# Patient Record
Sex: FEMALE | Race: WHITE | NOT HISPANIC OR LATINO | URBAN - METROPOLITAN AREA
[De-identification: names, ages, dates, MRNs, and addresses within clinical notes are randomized per-mention and may not be internally consistent; named-entity substitution may affect disease eponyms.]

---

## 2020-02-18 ENCOUNTER — EMERGENCY (EMERGENCY)
Facility: HOSPITAL | Age: 45
LOS: 1 days | Discharge: HOME | End: 2020-02-18
Attending: EMERGENCY MEDICINE | Admitting: EMERGENCY MEDICINE
Payer: COMMERCIAL

## 2020-02-18 VITALS
HEART RATE: 82 BPM | OXYGEN SATURATION: 98 % | WEIGHT: 220.02 LBS | DIASTOLIC BLOOD PRESSURE: 83 MMHG | SYSTOLIC BLOOD PRESSURE: 118 MMHG | RESPIRATION RATE: 15 BRPM | TEMPERATURE: 97 F | HEIGHT: 67 IN

## 2020-02-18 VITALS
HEART RATE: 75 BPM | RESPIRATION RATE: 17 BRPM | DIASTOLIC BLOOD PRESSURE: 74 MMHG | TEMPERATURE: 98 F | OXYGEN SATURATION: 98 % | SYSTOLIC BLOOD PRESSURE: 115 MMHG

## 2020-02-18 DIAGNOSIS — Z90.49 ACQUIRED ABSENCE OF OTHER SPECIFIED PARTS OF DIGESTIVE TRACT: Chronic | ICD-10-CM

## 2020-02-18 LAB
ALBUMIN SERPL ELPH-MCNC: 3.3 G/DL — SIGNIFICANT CHANGE UP (ref 3.3–5)
ALP SERPL-CCNC: 92 U/L — SIGNIFICANT CHANGE UP (ref 40–120)
ALT FLD-CCNC: 24 U/L — SIGNIFICANT CHANGE UP (ref 12–78)
ANION GAP SERPL CALC-SCNC: 8 MMOL/L — SIGNIFICANT CHANGE UP (ref 5–17)
APTT BLD: 25.3 SEC — LOW (ref 28.5–37)
AST SERPL-CCNC: 23 U/L — SIGNIFICANT CHANGE UP (ref 15–37)
BASOPHILS # BLD AUTO: 0.04 K/UL — SIGNIFICANT CHANGE UP (ref 0–0.2)
BASOPHILS NFR BLD AUTO: 0.6 % — SIGNIFICANT CHANGE UP (ref 0–2)
BILIRUB SERPL-MCNC: 0.4 MG/DL — SIGNIFICANT CHANGE UP (ref 0.2–1.2)
BUN SERPL-MCNC: 13 MG/DL — SIGNIFICANT CHANGE UP (ref 7–23)
CALCIUM SERPL-MCNC: 9.3 MG/DL — SIGNIFICANT CHANGE UP (ref 8.5–10.1)
CHLORIDE SERPL-SCNC: 107 MMOL/L — SIGNIFICANT CHANGE UP (ref 96–108)
CK MB BLD-MCNC: <2 % — SIGNIFICANT CHANGE UP (ref 0–3.5)
CK MB CFR SERPL CALC: <1 NG/ML — SIGNIFICANT CHANGE UP (ref 0–3.6)
CK SERPL-CCNC: 50 U/L — SIGNIFICANT CHANGE UP (ref 26–192)
CO2 SERPL-SCNC: 25 MMOL/L — SIGNIFICANT CHANGE UP (ref 22–31)
CREAT SERPL-MCNC: 0.67 MG/DL — SIGNIFICANT CHANGE UP (ref 0.5–1.3)
EOSINOPHIL # BLD AUTO: 0.08 K/UL — SIGNIFICANT CHANGE UP (ref 0–0.5)
EOSINOPHIL NFR BLD AUTO: 1.1 % — SIGNIFICANT CHANGE UP (ref 0–6)
GLUCOSE SERPL-MCNC: 98 MG/DL — SIGNIFICANT CHANGE UP (ref 70–99)
HCT VFR BLD CALC: 35.1 % — SIGNIFICANT CHANGE UP (ref 34.5–45)
HGB BLD-MCNC: 11.3 G/DL — LOW (ref 11.5–15.5)
IMM GRANULOCYTES NFR BLD AUTO: 0.4 % — SIGNIFICANT CHANGE UP (ref 0–1.5)
INR BLD: 1.02 RATIO — SIGNIFICANT CHANGE UP (ref 0.88–1.16)
LYMPHOCYTES # BLD AUTO: 1.95 K/UL — SIGNIFICANT CHANGE UP (ref 1–3.3)
LYMPHOCYTES # BLD AUTO: 28 % — SIGNIFICANT CHANGE UP (ref 13–44)
MCHC RBC-ENTMCNC: 26.4 PG — LOW (ref 27–34)
MCHC RBC-ENTMCNC: 32.2 GM/DL — SIGNIFICANT CHANGE UP (ref 32–36)
MCV RBC AUTO: 82 FL — SIGNIFICANT CHANGE UP (ref 80–100)
MONOCYTES # BLD AUTO: 0.47 K/UL — SIGNIFICANT CHANGE UP (ref 0–0.9)
MONOCYTES NFR BLD AUTO: 6.7 % — SIGNIFICANT CHANGE UP (ref 2–14)
NEUTROPHILS # BLD AUTO: 4.4 K/UL — SIGNIFICANT CHANGE UP (ref 1.8–7.4)
NEUTROPHILS NFR BLD AUTO: 63.2 % — SIGNIFICANT CHANGE UP (ref 43–77)
NRBC # BLD: 0 /100 WBCS — SIGNIFICANT CHANGE UP (ref 0–0)
PLATELET # BLD AUTO: 104 K/UL — LOW (ref 150–400)
POTASSIUM SERPL-MCNC: 3.8 MMOL/L — SIGNIFICANT CHANGE UP (ref 3.5–5.3)
POTASSIUM SERPL-SCNC: 3.8 MMOL/L — SIGNIFICANT CHANGE UP (ref 3.5–5.3)
PROT SERPL-MCNC: 7.6 G/DL — SIGNIFICANT CHANGE UP (ref 6–8.3)
PROTHROM AB SERPL-ACNC: 11.4 SEC — SIGNIFICANT CHANGE UP (ref 10–12.9)
RBC # BLD: 4.28 M/UL — SIGNIFICANT CHANGE UP (ref 3.8–5.2)
RBC # FLD: 13.9 % — SIGNIFICANT CHANGE UP (ref 10.3–14.5)
SODIUM SERPL-SCNC: 140 MMOL/L — SIGNIFICANT CHANGE UP (ref 135–145)
TROPONIN I SERPL-MCNC: <.015 NG/ML — SIGNIFICANT CHANGE UP (ref 0.01–0.04)
WBC # BLD: 6.97 K/UL — SIGNIFICANT CHANGE UP (ref 3.8–10.5)
WBC # FLD AUTO: 6.97 K/UL — SIGNIFICANT CHANGE UP (ref 3.8–10.5)

## 2020-02-18 PROCEDURE — 99284 EMERGENCY DEPT VISIT MOD MDM: CPT | Mod: 25

## 2020-02-18 PROCEDURE — 80053 COMPREHEN METABOLIC PANEL: CPT

## 2020-02-18 PROCEDURE — 85610 PROTHROMBIN TIME: CPT

## 2020-02-18 PROCEDURE — 99285 EMERGENCY DEPT VISIT HI MDM: CPT

## 2020-02-18 PROCEDURE — 85730 THROMBOPLASTIN TIME PARTIAL: CPT

## 2020-02-18 PROCEDURE — 93005 ELECTROCARDIOGRAM TRACING: CPT

## 2020-02-18 PROCEDURE — 93010 ELECTROCARDIOGRAM REPORT: CPT

## 2020-02-18 PROCEDURE — 71045 X-RAY EXAM CHEST 1 VIEW: CPT | Mod: 26

## 2020-02-18 PROCEDURE — 85027 COMPLETE CBC AUTOMATED: CPT

## 2020-02-18 PROCEDURE — 82553 CREATINE MB FRACTION: CPT

## 2020-02-18 PROCEDURE — 84484 ASSAY OF TROPONIN QUANT: CPT

## 2020-02-18 PROCEDURE — 71045 X-RAY EXAM CHEST 1 VIEW: CPT

## 2020-02-18 PROCEDURE — 82550 ASSAY OF CK (CPK): CPT

## 2020-02-18 PROCEDURE — 36415 COLL VENOUS BLD VENIPUNCTURE: CPT

## 2020-02-18 NOTE — CONSULT NOTE ADULT - SUBJECTIVE AND OBJECTIVE BOX
[facility] Cardiology Consult - Lolis Rasheed Jacky Decker [phone]    CHIEF COMPLAINT: Patient is a 44y old  Female still smoking now with a PEN, HTN, CAD s/p MI with angiogram and no intervention performed 3 years ago.  She states that at that time she presented to her PMD with generalized feeling unwell.  She denies having chest discomfort or shortness of breath then.  She states that today, while sitting at work, she gotten sudden severe sharp pain radiating down her LEFT arm into her hand.  No assoc dizziness, SOB, diaphoresis, Nausea, vomiting or chest discomfort.  With severe pain, she presents to the ER where her Trop has been negative.  She does not exercise and states that just bringing the laundry up from the basement makes her dyspneic.  She states that she is afraid to exercise as she will get shortness of breath and something will happen.      PAST MEDICAL & SURGICAL HISTORY:  Graves disease  MI (myocardial infarction)  HTN (hypertension)  S/P cholecystectomy      SOCIAL HISTORY: Alochol: Denied  Smoking: Nonsmoker  Drug Use: Denied  Marital Status:         FAMILY HISTORY: FAMILY HISTORY:      MEDICATIONS  (STANDING):    MEDICATIONS  (PRN):      Allergies    No Known Allergies    Intolerances        REVIEW OF SYSTEMS:  CONSTITUTIONAL: No weakness, fevers or chills  EYES/ENT: No visual changes;  No vertigo or throat pain   NECK: No pain or stiffness  RESPIRATORY: No cough, wheezing, hemoptysis; No shortness of breath  CARDIOVASCULAR: No chest pain or palpitations  GASTROINTESTINAL: No abdominal pain. No nausea, vomiting, or hematemesis; No diarrhea or constipation. No melena or hematochezia.  GENITOURINARY: No dysuria, frequency or hematuria  NEUROLOGICAL: No numbness or weakness  SKIN: No itching or rash  All other review of systems is negative unless indicated above    VITAL SIGNS:   Vital Signs Last 24 Hrs  T(C): 36.2 (18 Feb 2020 12:09), Max: 36.2 (18 Feb 2020 12:09)  T(F): 97.2 (18 Feb 2020 12:09), Max: 97.2 (18 Feb 2020 12:09)  HR: 82 (18 Feb 2020 16:10) (82 - 84)  BP: 118/78 (18 Feb 2020 16:10) (116/74 - 118/83)  BP(mean): --  RR: 16 (18 Feb 2020 16:10) (15 - 16)  SpO2: 98% (18 Feb 2020 16:10) (98% - 98%)    I&O's Summary      PHYSICAL EXAM:  Constitutional: Awake in NAD  HEENT:  EREN, EOMI  Neck: No JVD  Pulmonary: CTA B/L No R/R/W  Cardiovascular: PMI not palpable non-displaced Regular S1 and S2, no murmurs  Gastrointestinal: Bowel Sounds present, soft, nontender.   Extremities: Trace peripheral edema.   Neuro: No gross focal deficits    LABS: All Labs Reviewed:                        11.3   6.97  )-----------( 104      ( 18 Feb 2020 12:46 )             35.1     18 Feb 2020 12:46    140    |  107    |  13     ----------------------------<  98     3.8     |  25     |  0.67     Ca    9.3        18 Feb 2020 12:46    TPro  7.6    /  Alb  3.3    /  TBili  0.4    /  DBili  x      /  AST  23     /  ALT  24     /  AlkPhos  92     18 Feb 2020 12:46    PT/INR - ( 18 Feb 2020 12:46 )   PT: 11.4 sec;   INR: 1.02 ratio         PTT - ( 18 Feb 2020 12:46 )  PTT:25.3 sec  CARDIAC MARKERS ( 18 Feb 2020 12:46 )  <.015 ng/mL / x     / 50 U/L / x     / <1.0 ng/mL      Blood Culture:         RADIOLOGY/EKG:  EKG SR with no acute ST T changes

## 2020-02-18 NOTE — ED PROVIDER NOTE - OBJECTIVE STATEMENT
Pt is a 45 yo f who has hx of nstemi anxiety and depression graves/hashimotos pmd is dr Coley and cards is dr Daniels in [place] was at work today sitting at her desk when she felt sudden onset of numbness in left arm, light headedness and "disoriented" for a second.  feeling "weird" for just a moment like her bp was elevated she decided to go home.  fearing a recurrent silent mi she came to er because she has continued numbness in left arm from shoulder to hand  no weakness.  she is a daily smoker social drinker denies drugs sp c section, gb tubal ligation  absolutely denies any other neuro complaints and declined neurologic eval or head ct at this visit

## 2020-02-18 NOTE — ED PROVIDER NOTE - CONSTITUTIONAL, MLM
normal... Well appearing, awake, alert, obese, oriented to person, place, time/situation and in no apparent distress.

## 2020-02-18 NOTE — ED PROVIDER NOTE - CARE PROVIDER_API CALL
Song Rasheed (DO)  Cardiovascular Disease  [address] [place] [address]  Phone: [phone]  Fax: [phone]  Follow Up Time:

## 2020-02-18 NOTE — ED ADULT NURSE NOTE - OBJECTIVE STATEMENT
pt was at work when she felt numbness to left arm/ shoulder and felt "weird"  b/p was elevated - pt decided to leave work, went home and decided to come to ER because left arm cont to feel numb-pt with equal strength to all extremities

## 2020-02-18 NOTE — ED ADULT NURSE REASSESSMENT NOTE - NS ED NURSE REASSESS COMMENT FT1
pt awaits cardio Dr. Rasheed- vss- pt states arm numbness has subsided- pt resting comfortably now - denies pain

## 2020-02-18 NOTE — ED PROVIDER NOTE - PROGRESS NOTE DETAILS
dr simpson paged for cards consultation dr simpson paged for cards consultation/ aware at 13:50 dr simpson paged for cards consultation/ aware at 13:50, pt aware of this conversation no changes,   agrees to wait for cardiologist. doing well still awaiting dr simpson dr simpson at bedside dr simpson at bedside pt cleared for dc

## 2020-02-18 NOTE — CONSULT NOTE ADULT - ASSESSMENT
44F with known CAD, s/p MI with no intervention presents with atypical Chest discomfort ruling out for MI with normal ECG    Suggest:  1.  No further inpatient cardiac work up  2. Get exercise stress test and echocardiogram done in the next 1 -2 weeks in our office  3. Advised that she continue on Toprol, Amlodipine and HCTZ for HTN  4. Advised that she continue on Lipitor, Zetia and Aspirin for CAD s/p MI  5. Discussed cigarette smoking cessation > 15 minutes with increased risk of MI and CVA from tobacco.  Discussed quitting and benefits.

## 2020-02-18 NOTE — ED ADULT NURSE NOTE - CAS TRG GEN SKIN CONDITION
[facility] Follow-up    Subjective:      Patient ID:   Robina Potter                                       [address]  45 y.o. female                                           [place] [address]  1972  Dev Strong    Chief Complaint:   Anemia, LBP    HPI:  45 y.o. female with anemia 2nd decreased cellularity of bone marrow.  Chronic low back pain sx +.  Evaluation for malignancy negative.  Evaluation per Dr. Melchor negative.  GERD +.  Pyelonephritis +.  Fibroids hx.  Recurrant UTI.  Cystoscopy done.  Dr. Strong treated her for depression.  She is to follow-up with PMD for hernia.  She has GI sx, on meds per Dr. Strong..  She saw Dr. Strong, may need hernia repair.  Saw Dr. Spence for migraine eval? Sx better.  Bladder eval negative.  Dr. Méndez.  She denies bladder sx now.    Smoke 1/3 ppd.  Etoh no.  Job, security worker.    Cholecystectomy +, M0.    Mom  SLE.  1 child cerebral palsy.      ROS:   GEN: normal without any fever, night sweats or weight loss  HEENT:  HA's better  CV: normal with no CP, SOB, PND, SIMON or orthopnea  PULM: normal with no SOB, cough, hemoptysis, sputum or pleuritic pain  GI: GERD.  See history of present illness.  : Pyelonephritis hx. SEE HPI.  BREAST: normal with no mass, discharge, pain  SKIN: normal with no rash, erythema.    Review of patient's allergies indicates:  No Known Allergies    Current Outpatient Medications:     cetirizine (ZYRTEC) 10 MG tablet, , Disp: , Rfl:     clotrimazole (MYCELEX) 10 mg lazaro, , Disp: , Rfl:     DULoxetine (CYMBALTA) 60 MG capsule, Take 60 mg by mouth., Disp: , Rfl:     fluticasone (FLONASE) 50 mcg/actuation nasal spray, , Disp: , Rfl:     hydrocodone-acetaminophen 7.5-325mg (NORCO) 7.5-325 mg per tablet, Take 1 tablet by mouth., Disp: , Rfl:     hydrOXYzine pamoate (VISTARIL) 25 MG Cap, TAKE 1 CAPSULE BY MOUTH 3 TIMES DAILY AS NEEDED FOR ITCHING, Disp: , Rfl:     loratadine (CLARITIN) 10 mg tablet, , Disp: , Rfl:      omeprazole (PRILOSEC) 20 MG capsule, , Disp: , Rfl:     topiramate (TOPAMAX) 100 MG tablet, , Disp: , Rfl:     trazodone (DESYREL) 100 MG tablet, , Disp: , Rfl:     hydrocodone-acetaminophen (VICODIN) 5-500 mg per tablet, , Disp: , Rfl:           Objective:   Vitals:  Blood pressure 138/80, pulse 78, temperature 98.6 °F (37 °C), resp. rate 20, weight 100.7 kg (222 lb).    Physical Examination:   GEN: no apparent distress, comfortable  HEAD: atraumatic and normocephalic  EYES: no pallor, no icterus  ENT: no pharyngeal erythema, external ears WNL; no nasal discharge  NECK: no masses, thyroid normal, trachea midline, no LAD/LN's, supple  CV: RRR with no murmur; normal pulse; normal S1 and S2; no pedal edema  CHEST: Normal respiratory effort; CTAB; normal breath sounds; no wheeze or crackles  ABDOM: nontender and nondistended; soft; normal bowel sounds; no rebound/guarding  MUSC/Skeletal: ROM normal; no crepitus; joints normal; no deformities or arthropathy  EXTREM: no clubbing, cyanosis, inflammation   SKIN: no rashes, lesions, ulcers, petechiae  : no CVAT.  NEURO: grossly intact; motor/sensory WNL; no tremors  PSYCH: normal mood, affect and behavior  LYMPH: normal cervical, supraclavicular, axillary and groin LN's      Labs:     None recently      Radiology/Diagnostic Studies:    Mamm 1/2018 negative.  Bone marrow 2014, 25% cellularity.  IgG 2,000.        Assessment:   (1) 45 y.o. female with chronic anemia secondary to decreased bone marrow cellularity.    (2)Chronic LBP.  Refill meds.  RTC 1 months.    (3)Hx of pyelonephritis, recurrent UTIs.   evaluation negative results.  But with frequency sx, ?UTI, trial of Cipro BID.                  Warm

## 2020-02-18 NOTE — ED PROVIDER NOTE - MUSCULOSKELETAL, MLM
Spine appears normal, range of motion is not limited, no muscle or joint tenderness, neg phalens tinnels

## 2020-02-18 NOTE — ED PROVIDER NOTE - PATIENT PORTAL LINK FT
You can access the FollowMyHealth Patient Portal offered by [facility] by registering at the following website: [email or web address] By joining [email or web address]’s FollowMyHealth portal, you will also be able to view your health information using other applications (apps) compatible with our system.

## 2022-05-27 ENCOUNTER — OUTPATIENT (OUTPATIENT)
Dept: OUTPATIENT SERVICES | Facility: HOSPITAL | Age: 47
LOS: 1 days | End: 2022-05-27
Payer: MEDICAID

## 2022-05-27 DIAGNOSIS — Z90.49 ACQUIRED ABSENCE OF OTHER SPECIFIED PARTS OF DIGESTIVE TRACT: Chronic | ICD-10-CM

## 2022-05-27 DIAGNOSIS — R06.02 SHORTNESS OF BREATH: ICD-10-CM

## 2022-05-27 DIAGNOSIS — I10 ESSENTIAL (PRIMARY) HYPERTENSION: ICD-10-CM

## 2022-05-27 DIAGNOSIS — R07.9 CHEST PAIN, UNSPECIFIED: ICD-10-CM

## 2022-05-27 PROBLEM — I21.9 ACUTE MYOCARDIAL INFARCTION, UNSPECIFIED: Chronic | Status: ACTIVE | Noted: 2020-02-18

## 2022-05-27 PROBLEM — E05.00 THYROTOXICOSIS WITH DIFFUSE GOITER WITHOUT THYROTOXIC CRISIS OR STORM: Chronic | Status: ACTIVE | Noted: 2020-02-18

## 2022-05-27 PROCEDURE — 93306 TTE W/DOPPLER COMPLETE: CPT
